# Patient Record
Sex: MALE | Race: WHITE | NOT HISPANIC OR LATINO | Employment: OTHER | URBAN - METROPOLITAN AREA
[De-identification: names, ages, dates, MRNs, and addresses within clinical notes are randomized per-mention and may not be internally consistent; named-entity substitution may affect disease eponyms.]

---

## 2024-05-24 DIAGNOSIS — M05.79 RHEUMATOID ARTHRITIS INVOLVING MULTIPLE SITES WITH POSITIVE RHEUMATOID FACTOR: Primary | ICD-10-CM

## 2024-05-30 RX ORDER — METHOTREXATE 2.5 MG/1
15 TABLET ORAL WEEKLY
COMMUNITY
Start: 2024-03-04

## 2024-05-30 RX ORDER — METHOTREXATE 2.5 MG/1
15 TABLET ORAL WEEKLY
Qty: 24 TABLET | Refills: 0 | Status: CANCELLED | OUTPATIENT
Start: 2024-05-30

## 2024-05-30 NOTE — TELEPHONE ENCOUNTER
Decline methotrexate refill until he does methotrexate monitoring labs.  No labs since 10/27/2023.  I signed new standing order for labs.  Please send lab order to him or have him come to [facility] for labs.

## 2024-06-02 PROBLEM — Z79.1 NSAID LONG-TERM USE: Chronic | Status: ACTIVE | Noted: 2024-06-02

## 2024-06-02 PROBLEM — M81.0 AGE-RELATED OSTEOPOROSIS WITHOUT CURRENT PATHOLOGICAL FRACTURE: Chronic | Status: ACTIVE | Noted: 2024-06-02

## 2024-06-02 PROBLEM — M05.9 SEROPOSITIVE RHEUMATOID ARTHRITIS: Chronic | Status: ACTIVE | Noted: 2024-06-02

## 2024-06-02 PROBLEM — M15.9 PRIMARY OSTEOARTHRITIS INVOLVING MULTIPLE JOINTS: Chronic | Status: ACTIVE | Noted: 2024-06-02

## 2024-06-02 PROBLEM — M15.0 PRIMARY OSTEOARTHRITIS INVOLVING MULTIPLE JOINTS: Chronic | Status: ACTIVE | Noted: 2024-06-02

## 2024-06-02 PROBLEM — Z79.899 ENCOUNTER FOR LONG-TERM (CURRENT) USE OF HIGH-RISK MEDICATION: Chronic | Status: ACTIVE | Noted: 2024-06-02

## 2024-06-03 PROBLEM — M05.79 RHEUMATOID ARTHRITIS OF MULTIPLE SITES WITHOUT ORGAN OR SYSTEM INVOLVEMENT WITH POSITIVE RHEUMATOID FACTOR: Status: ACTIVE | Noted: 2024-06-03

## 2024-06-03 NOTE — ASSESSMENT & PLAN NOTE
Methotrexate     Labs every 12 weeks for toxicity monitoring on methotrexate  We have no recent labs. Patient will go to [facility] in [place] to have labs done.  Methotrexate well tolerated without sign of toxicity

## 2024-06-03 NOTE — ASSESSMENT & PLAN NOTE
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 Swollen joint count 0. Tender joint count 0. good prognosis  continue mtx which is effective and well tolerated.  Medication refilled  The patient is doing satisfactorily on the current medical therapy. Recent labs reviewed. Plan will be to continue current medication, frequent intensive lab monitoring every 12 weeks (CBC, CMP) for toxicity monitoring and follow up in 6 months

## 2024-06-03 NOTE — ASSESSMENT & PLAN NOTE
DXA 3/20/2019  fosamax recommended 2017 - continually declined by the patient  Declines further bone density scans 11/22.  Discussed DEXA scans with patient today, he again declined.    Fosamax recommended in February 2017 but the patient has declined to take it due to potential side effects and schedule required to take it.    I recommended he take calcium and vitamin D and work on regular exercise weight   patient had dental work and was supposed to start Fosamax after the dental work was completed.  He continues to decline Fosamax or bisphosphonate therapy for his osteoporosis.  He understands high risk of fracture which could lead to death from his osteoporosis.  He declines further bone density scan

## 2024-06-03 NOTE — ASSESSMENT & PLAN NOTE
minimally symptomatic osteoarthritis on p.r.n. Sulindac   Risks of NSAIDs discussed including GI upset, GI bleeding, renal and hepatic risks and the risks of cardiovascular disease and stroke. Warned patient not to take with other NSAIDs including OTC NSAIDs.

## 2024-06-03 NOTE — ASSESSMENT & PLAN NOTE
Sulindac     Risks of NSAIDs discussed including GI upset, GI bleeding, renal and hepatic risks and the risks of cardiovascular disease and stroke. Warned patient not to take with other NSAIDs including OTC NSAIDs.

## 2024-06-04 ENCOUNTER — OFFICE VISIT (OUTPATIENT)
Age: 79
End: 2024-06-04
Payer: MEDICARE

## 2024-06-04 VITALS
SYSTOLIC BLOOD PRESSURE: 124 MMHG | WEIGHT: 178.3 LBS | HEART RATE: 72 BPM | DIASTOLIC BLOOD PRESSURE: 80 MMHG | TEMPERATURE: 97.7 F | BODY MASS INDEX: 25.53 KG/M2 | HEIGHT: 70 IN

## 2024-06-04 DIAGNOSIS — Z79.899 ENCOUNTER FOR LONG-TERM (CURRENT) USE OF HIGH-RISK MEDICATION: Chronic | ICD-10-CM

## 2024-06-04 DIAGNOSIS — M15.9 PRIMARY OSTEOARTHRITIS INVOLVING MULTIPLE JOINTS: Chronic | ICD-10-CM

## 2024-06-04 DIAGNOSIS — Z79.1 NSAID LONG-TERM USE: Chronic | ICD-10-CM

## 2024-06-04 DIAGNOSIS — M81.0 AGE-RELATED OSTEOPOROSIS WITHOUT CURRENT PATHOLOGICAL FRACTURE: Chronic | ICD-10-CM

## 2024-06-04 DIAGNOSIS — M05.9 SEROPOSITIVE RHEUMATOID ARTHRITIS: Primary | Chronic | ICD-10-CM

## 2024-06-04 PROCEDURE — 99214 OFFICE O/P EST MOD 30 MIN: CPT | Performed by: NURSE PRACTITIONER

## 2024-06-04 PROCEDURE — G2211 COMPLEX E/M VISIT ADD ON: HCPCS | Performed by: NURSE PRACTITIONER

## 2024-06-04 RX ORDER — SULINDAC 200 MG/1
200 TABLET ORAL DAILY PRN
Qty: 90 TABLET | Refills: 1 | Status: SHIPPED | OUTPATIENT
Start: 2024-06-04

## 2024-06-04 RX ORDER — METHOTREXATE 2.5 MG/1
15 TABLET ORAL WEEKLY
Qty: 90 TABLET | Refills: 1 | Status: SHIPPED | OUTPATIENT
Start: 2024-06-04

## 2024-06-04 NOTE — PROGRESS NOTES
Office Visit       Date: 06/04/2024   Patient Name: Cristopher Kaba  MRN: [id number]  YOB: 1945    Referring Physician: Evangelina Cantu, Unknown*     Chief Complaint:   Chief Complaint   Patient presents with    Rheumatoid Arthritis    Osteoarthritis       History of Present Illness: Cristopher Kaba is a 79 y.o. male who is here today for follow-up of rheumatoid arthritis.  He needs a refill of sulindac.  He reports feeling the same.  He rates his pain 5 out of 10 and has 10 minutes of morning stiffness.  He did not complete the RAPID3 today.  He reports he has pain in the hands when he overworks them.      Subjective   Review of Systems:      Past Medical History:   Past Medical History:   Diagnosis Date    High risk medication use     Osteoarthritis     Osteoporosis     Rheumatoid arthritis        Past Surgical History: History reviewed. No pertinent surgical history.    Family History:   Family History   Problem Relation Age of Onset    Arthritis Mother     Arthritis Father        Social History:   Social History     Socioeconomic History    Marital status:    Tobacco Use    Smoking status: Never    Smokeless tobacco: Never   Vaping Use    Vaping status: Never Used   Substance and Sexual Activity    Alcohol use: Not Currently    Drug use: Defer       Medications:   Current Outpatient Medications:     methotrexate 2.5 MG tablet, Take 6 tablets by mouth 1 (One) Time Per Week., Disp: 90 tablet, Rfl: 1    sulindac (CLINORIL) 200 MG tablet, Take 1 tablet by mouth Daily As Needed for Mild Pain., Disp: 90 tablet, Rfl: 1    Allergies: No Known Allergies    I have reviewed and updated the patient's chief complaint, history of present illness, review of systems, past medical history, surgical history, family history, social history, medications and allergy list as appropriate.     Objective    Vital Signs:   Vitals:    06/04/24 0946   BP: 124/80   BP Location: Left arm   Patient Position:  "Sitting   Cuff Size: Adult   Pulse: 72   Temp: 97.7 °F (36.5 °C)   Weight: 80.9 kg (178 lb 4.8 oz)   Height: 177.8 cm (70\")   PainSc:   5   PainLoc: Foot     Body mass index is 25.58 kg/m².   BMI is >= 25 and <30. (Overweight) The following options were offered after discussion;: none       Physical Exam:  Physical Exam  Vitals reviewed.   Constitutional:       Appearance: Normal appearance.   HENT:      Head: Normocephalic and atraumatic.      Mouth/Throat:      Mouth: Mucous membranes are moist.   Eyes:      Conjunctiva/sclera: Conjunctivae normal.   Cardiovascular:      Rate and Rhythm: Normal rate and regular rhythm.      Pulses: Normal pulses.      Heart sounds: Normal heart sounds.   Pulmonary:      Effort: Pulmonary effort is normal.      Breath sounds: Normal breath sounds.   Musculoskeletal:         General: Normal range of motion.      Cervical back: Normal range of motion and neck supple.      Comments: Crepitus right knee  OA changes bilateral DIP's.   Skin:     General: Skin is warm and dry.   Neurological:      General: No focal deficit present.      Mental Status: He is alert and oriented to person, place, and time. Mental status is at baseline.   Psychiatric:         Mood and Affect: Mood normal.         Behavior: Behavior normal.         Thought Content: Thought content normal.         Judgment: Judgment normal.          Results Review:   Imaging Results (Last 24 Hours)       ** No results found for the last 24 hours. **            Procedures    Assessment / Plan    Assessment/Plan:   Diagnoses and all orders for this visit:    1. 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 Swollen joint count 0. Tender joint count 0. good prognosis  continue mtx which is effective and " well tolerated.  Medication refilled  The patient is doing satisfactorily on the current medical therapy. Recent labs reviewed. Plan will be to continue current medication, frequent intensive lab monitoring every 12 weeks (CBC, CMP) for toxicity monitoring and follow up in 6 months    Orders:  -     sulindac (CLINORIL) 200 MG tablet; Take 1 tablet by mouth Daily As Needed for Mild Pain.  Dispense: 90 tablet; Refill: 1  -     C-reactive Protein; Standing  -     Sedimentation Rate; Standing  -     Comprehensive Metabolic Panel; Standing  -     methotrexate 2.5 MG tablet; Take 6 tablets by mouth 1 (One) Time Per Week.  Dispense: 90 tablet; Refill: 1  -     CBC & Differential; Future    2. Encounter for long-term (current) use of high-risk medication  Assessment & Plan:  Methotrexate     Labs every 12 weeks for toxicity monitoring on methotrexate  We have no recent labs. Patient will go to [facility] in [place] to have labs done.  Methotrexate well tolerated without sign of toxicity    Orders:  -     C-reactive Protein; Standing  -     Sedimentation Rate; Standing  -     Comprehensive Metabolic Panel; Standing  -     CBC & Differential; Future    3. NSAID long-term use  Assessment & Plan:  Sulindac     Risks of NSAIDs discussed including GI upset, GI bleeding, renal and hepatic risks and the risks of cardiovascular disease and stroke. Warned patient not to take with other NSAIDs including OTC NSAIDs.      4. Age-related osteoporosis without current pathological fracture  Assessment & Plan:  DXA 3/20/2019  fosamax recommended 2017 - continually declined by the patient  Declines further bone density scans 11/22.  Discussed DEXA scans with patient today, he again declined.    Fosamax recommended in February 2017 but the patient has declined to take it due to potential side effects and schedule required to take it.    I recommended he take calcium and vitamin D and work on regular exercise weight   patient had dental work  and was supposed to start Fosamax after the dental work was completed.  He continues to decline Fosamax or bisphosphonate therapy for his osteoporosis.  He understands high risk of fracture which could lead to death from his osteoporosis.  He declines further bone density scan      5. Primary osteoarthritis involving multiple joints  Assessment & Plan:  minimally symptomatic osteoarthritis on p.r.n. Sulindac   Risks of NSAIDs discussed including GI upset, GI bleeding, renal and hepatic risks and the risks of cardiovascular disease and stroke. Warned patient not to take with other NSAIDs including OTC NSAIDs.    Orders:  -     sulindac (CLINORIL) 200 MG tablet; Take 1 tablet by mouth Daily As Needed for Mild Pain.  Dispense: 90 tablet; Refill: 1        Follow Up:   Return in about 4 months (around 10/4/2024) for Dr. Ahn, MACI Sky.        MACI Chery  [facility]

## 2024-10-08 ENCOUNTER — OFFICE VISIT (OUTPATIENT)
Age: 79
End: 2024-10-08
Payer: MEDICARE

## 2024-10-08 VITALS
BODY MASS INDEX: 23.98 KG/M2 | WEIGHT: 177 LBS | TEMPERATURE: 97.3 F | HEIGHT: 72 IN | SYSTOLIC BLOOD PRESSURE: 120 MMHG | HEART RATE: 58 BPM | DIASTOLIC BLOOD PRESSURE: 82 MMHG

## 2024-10-08 DIAGNOSIS — Z79.899 ENCOUNTER FOR LONG-TERM (CURRENT) USE OF HIGH-RISK MEDICATION: ICD-10-CM

## 2024-10-08 DIAGNOSIS — M05.9 SEROPOSITIVE RHEUMATOID ARTHRITIS: Primary | ICD-10-CM

## 2024-10-08 PROCEDURE — 1159F MED LIST DOCD IN RCRD: CPT | Performed by: INTERNAL MEDICINE

## 2024-10-08 PROCEDURE — 1160F RVW MEDS BY RX/DR IN RCRD: CPT | Performed by: INTERNAL MEDICINE

## 2024-10-08 PROCEDURE — 99214 OFFICE O/P EST MOD 30 MIN: CPT | Performed by: INTERNAL MEDICINE

## 2024-10-08 PROCEDURE — G2211 COMPLEX E/M VISIT ADD ON: HCPCS | Performed by: INTERNAL MEDICINE

## 2024-10-08 RX ORDER — FOLIC ACID 1 MG/1
1 TABLET ORAL DAILY
Qty: 90 TABLET | Refills: 3 | Status: SHIPPED | OUTPATIENT
Start: 2024-10-08

## 2024-10-08 NOTE — PROGRESS NOTES
Office Visit       Date: 10/08/2024   Patient Name: Cristopher Kaba  MRN: [id number]  YOB: 1945    Referring Physician: No ref. provider found     Chief Complaint: Rheumatoid arthritis  No chief complaint on file.      History of Present Illness: Cristopher Kaba is a 79 y.o. male who is here today for follow-up of rheumatoid arthritis.      Overall doing well on methotrexate.  No rheumatoid flares.  No side effects to medication.    He has not done labs in some time.  He knows he is way overdue.    He reports he has pain in the hands when he overworks them.      Subjective   Review of Systems:      Past Medical History:   Past Medical History:   Diagnosis Date    High risk medication use     Osteoarthritis     Osteoporosis     Rheumatoid arthritis        Past Surgical History: History reviewed. No pertinent surgical history.    Family History:   Family History   Problem Relation Age of Onset    Arthritis Mother     Arthritis Father        Social History:   Social History     Socioeconomic History    Marital status:    Tobacco Use    Smoking status: Never    Smokeless tobacco: Never   Vaping Use    Vaping status: Never Used   Substance and Sexual Activity    Alcohol use: Not Currently    Drug use: Defer    Sexual activity: Defer       Medications:   Current Outpatient Medications:     methotrexate 2.5 MG tablet, Take 6 tablets by mouth 1 (One) Time Per Week., Disp: 90 tablet, Rfl: 1    sulindac (CLINORIL) 200 MG tablet, Take 1 tablet by mouth Daily As Needed for Mild Pain., Disp: 90 tablet, Rfl: 1    folic acid (FOLVITE) 1 MG tablet, Take 1 tablet by mouth Daily., Disp: 90 tablet, Rfl: 3    Allergies: No Known Allergies    I have reviewed and updated the patient's chief complaint, history of present illness, review of systems, past medical history, surgical history, family history, social history, medications and allergy list as appropriate.     Objective    Vital Signs:   Vitals:     "10/08/24 1011   BP: 120/82   BP Location: Left arm   Patient Position: Sitting   Cuff Size: Adult   Pulse: 58   Temp: 97.3 °F (36.3 °C)   Weight: 80.3 kg (177 lb)   Height: 182.9 cm (72\")   PainSc:   5   PainLoc: Generalized       Body mass index is 24.01 kg/m².   BMI is >= 25 and <30. (Overweight) The following options were offered after discussion;: none       Physical Exam:  MUSCULOSKELETAL:   No peripheral synovitis  OA changes hands  Crepitus knees    Complete joint exam was performed including the MCPs, PIPs, DIPs of the hands, wrists, elbows, shoulders, hips, knees and ankles.  No soft tissue swelling or tenderness is present except as above.    General: The patient is well-developed and well nourished. Cooperative, alert and oriented. Affect is normal. Hydration appears normal.   HEENT: Normocephalic and atraumatic. No notable alopecia. Lids and conjunctiva are normal. Pupils are equal and sclera are clear. Oropharynx is clear   NECK neck is supple without adenopathy, masses or thyromegaly.   CARDIOVASCULAR: Regular rate and rhythm. No murmurs, rubs or gallops   LUNGS: Effort is normal. Lungs are clear bilateral   ABDOMEN: Not examined  EXTREMITIES: Peripheral pulses are intact. No clubbing.   SKIN: No rashes. No subcutaneous nodules. No digital ulcers. No sclerodactyly.   NEUROLOGIC: Gait is normal. Strength testing is normal.  No focal neurologic deficits     Results Review:   Imaging Results (Last 24 Hours)       ** No results found for the last 24 hours. **            Procedures    Assessment / Plan           1. 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 Swollen joint count 0. Tender joint count 0. good prognosis  continue mtx 15 mg once weekly which is effective and well " tolerated.  -He admits he has not done labs in some time maybe over 6 months  -Reinforced that he needs labs every 12 weeks for monitoring on methotrexate.  New standing lab order provided  -No refill methotrexate until labs done and received  The patient is doing satisfactorily on the current medical therapy. Recent labs reviewed. Plan will be to continue current medication, frequent intensive lab monitoring every 12 weeks (CBC, CMP) for toxicity monitoring and follow up in 4 months       2. Encounter for long-term (current) use of high-risk medication  Assessment & Plan:  Methotrexate      Labs every 12 weeks for toxicity monitoring on methotrexate  We have no recent labs. Patient will go to [facility] in [place] to have labs done.    Methotrexate well tolerated without sign of toxicity    Risk include but are not limited to severe liver damage so can be fatal, the possible need for liver biopsy, bone marrow suppression that can lead to dangerously low blood counts, GI side effects including mouth sores and diarrhea, fatigue, and the rare risk of severe pulmonary complications.  There should be no alcohol consumed with methotrexate.  Methotrexate can cause severe fetal abnormalities with his mother father is taking the medication and thus must be avoided if pregnancy is a possibility.  All medication is to be taken 1 day a week only.  The need for Q 8-12-week labs and the need for folic acid supplement were discussed.        3. NSAID long-term use  Assessment & Plan:  Sulindac      Risks of NSAIDs discussed including GI upset, GI bleeding, renal and hepatic risks and the risks of cardiovascular disease and stroke. Warned patient not to take with other NSAIDs including OTC NSAIDs.        4. Age-related osteoporosis without current pathological fracture  Assessment & Plan:  DXA 3/20/2019  fosamax recommended 2017 - continually declined by the patient  Declines further bone density scans 11/22.  Discussed DEXA scans  with patient today, he again declined.     Fosamax recommended in February 2017 but the patient has declined to take it due to potential side effects and schedule required to take it.    I recommended he take calcium and vitamin D and work on regular exercise weight   patient had dental work and was supposed to start Fosamax after the dental work was completed.    He continues to decline Fosamax or bisphosphonate therapy for his osteoporosis.  He understands high risk of fracture which could lead to death from his osteoporosis.  He declines further bone density scans        5. Primary osteoarthritis involving multiple joints  Assessment & Plan:  minimally symptomatic osteoarthritis on p.r.n. Sulindac   Risks of NSAIDs discussed including GI upset, GI bleeding, renal and hepatic risks and the risks of cardiovascular disease and stroke. Warned patient not to take with other NSAIDs including OTC NSAIDs.        Assessment/Plan:   Diagnoses and all orders for this visit:    1. Seropositive rheumatoid arthritis (Primary)  -     Comprehensive Metabolic Panel; Standing  -     CBC Auto Differential; Standing  -     C-reactive Protein; Standing  -     Sedimentation Rate; Standing    2. Encounter for long-term (current) use of high-risk medication  -     Comprehensive Metabolic Panel; Standing  -     CBC Auto Differential; Standing  -     C-reactive Protein; Standing  -     Sedimentation Rate; Standing    Other orders  -     folic acid (FOLVITE) 1 MG tablet; Take 1 tablet by mouth Daily.  Dispense: 90 tablet; Refill: 3          Follow Up:   Return in about 4 months (around 2/8/2025) for Followup REGINE Ahn MD  [facility] [place]

## 2024-12-27 ENCOUNTER — TELEPHONE (OUTPATIENT)
Age: 79
End: 2024-12-27
Payer: MEDICARE

## 2024-12-27 DIAGNOSIS — M15.0 PRIMARY OSTEOARTHRITIS INVOLVING MULTIPLE JOINTS: Chronic | ICD-10-CM

## 2024-12-27 DIAGNOSIS — M05.9 SEROPOSITIVE RHEUMATOID ARTHRITIS: Chronic | ICD-10-CM

## 2024-12-27 RX ORDER — SULINDAC 200 MG/1
200 TABLET ORAL DAILY PRN
Qty: 90 TABLET | Refills: 1 | Status: SHIPPED | OUTPATIENT
Start: 2024-12-27

## 2025-02-17 ENCOUNTER — TELEPHONE (OUTPATIENT)
Age: 80
End: 2025-02-17
Payer: MEDICARE

## 2025-02-18 ENCOUNTER — TELEPHONE (OUTPATIENT)
Age: 80
End: 2025-02-18
Payer: MEDICARE

## 2025-02-18 NOTE — TELEPHONE ENCOUNTER
File Link    Scan on 2/17/2025 0932 by [employer], [place]: TOMAS DOUGHERTY, [id number]        Key Information    Document ID File Type Document Type Description   [id number] Image REFERRAL/PRE-AUTH MRN - SCAN TOMAS DOUGHERTY, [id number]     Import Information    Attached At Date Time User Dept   Patient Level 2/17/2025  9:32 AM [employer], [place]

## 2025-02-20 NOTE — TELEPHONE ENCOUNTER
Pt states he got labs done at [facility] in [place]. I was unable to locate them and even called the office. Pt is upset because he is almost out oh MTX but I advised him we needed labs first. Last labs 11/7/2024

## 2025-02-20 NOTE — TELEPHONE ENCOUNTER
"I spoke with patient and reiterated to him that we cannot send in refill of methotrexate until he has labs done. I told him that it is preferred for patients to have labs done every 8 weeks but Dr. Ahn will let patients go 12 weeks in between labs, but at this point he has not had labs done in 15 weeks. I  let him know that [facility] in [place] has his standing order but I can also fax it again to ensure they have it for him. Patient states that he would have a refill still if we sent it in after he had labs in November like we were supposed to. I do see that [facility] documented in note from 10/8/24 that we could not refill until labs were done, but we were not contacted until now asking for refill. I advised patient that this was irrelevant as even if he had refills he was still supposed to have labs done every 12 weeks. Advised him that regular lab monitoring was for his own good to monitor liver enzymes, blood counts, etc. Patient wanted to know what would happen if he stops methotrexate and   I told him he could experience pain/swelling, but our hands were tied and we cannot refill without the labs. Patient was silent for several seconds so I asked if he was still there, and he states that he is \"trying not to say something that he shouldn't.\" I said, \"okay.\" He states that he will not to be able to go have labs done for probably a week. I told him to let us know once he has labs done so that we can make sure we get results and send in refill. Patient states he \"thinks this is wrong.\" I told him that he was entitled to that opinion but I was just doing my job. Patient said, \"fine\" and disconnected call.  "

## 2025-02-27 DIAGNOSIS — M05.9 SEROPOSITIVE RHEUMATOID ARTHRITIS: Chronic | ICD-10-CM

## 2025-02-27 RX ORDER — METHOTREXATE 2.5 MG/1
15 TABLET ORAL WEEKLY
Qty: 72 TABLET | Refills: 0 | Status: SHIPPED | OUTPATIENT
Start: 2025-02-27

## 2025-04-06 NOTE — PROGRESS NOTES
Office Visit       Date: 04/08/2025   Patient Name: Cristopher Kaba  MRN: [id number]  YOB: 1945    Referring Physician: No ref. provider found     Chief Complaint: Rheumatoid arthritis  Chief Complaint   Patient presents with    Rheumatoid Arthritis       History of Present Illness: Cristopher Kaba is a 80 y.o. male who is here today for follow-up of rheumatoid arthritis.      Overall doing well on methotrexate.  No rheumatoid flares.  No side effects to medication.    He has not done labs in some time.  He knows he is way overdue.    He reports he has pain in the hands when he overworks them.    1 4/8/2025: Today patient reports feeling the same.  He rates his pain 5.5 out of 10, global 3 out of 10 and 1 minute of morning stiffness.  He would like a refill of methotrexate today.    Subjective   Review of Systems:  Patient denies all symptoms on review of symptoms page today    Past Medical History:   Past Medical History:   Diagnosis Date    High risk medication use     Osteoarthritis     Osteoporosis     Rheumatoid arthritis        Past Surgical History: History reviewed. No pertinent surgical history.    Family History:   Family History   Problem Relation Age of Onset    Arthritis Mother     Arthritis Father        Social History:   Social History     Socioeconomic History    Marital status:    Tobacco Use    Smoking status: Never    Smokeless tobacco: Never   Vaping Use    Vaping status: Never Used   Substance and Sexual Activity    Alcohol use: Not Currently    Drug use: Defer    Sexual activity: Defer       Medications:   Current Outpatient Medications:     folic acid (FOLVITE) 1 MG tablet, Take 1 tablet by mouth Daily., Disp: 90 tablet, Rfl: 3    methotrexate 2.5 MG tablet, Take 6 tablets by mouth 1 (One) Time Per Week., Disp: 72 tablet, Rfl: 1    sulindac (CLINORIL) 200 MG tablet, Take 1 tablet by mouth Daily As Needed for Mild Pain., Disp: 90 tablet, Rfl: 1    Allergies: No  "Known Allergies    I have reviewed and updated the patient's chief complaint, history of present illness, review of systems, past medical history, surgical history, family history, social history, medications and allergy list as appropriate.     Objective    Vital Signs:   Vitals:    04/08/25 1029   BP: 114/72   BP Location: Left arm   Patient Position: Sitting   Cuff Size: Adult   Pulse: 68   Temp: 96.9 °F (36.1 °C)   Weight: 81.1 kg (178 lb 14.4 oz)   Height: 182.9 cm (72.01\")   PainSc: 6          Body mass index is 24.26 kg/m².   BMI is >= 25 and <30. (Overweight) The following options were offered after discussion;: none       Physical Exam:  MUSCULOSKELETAL:   No peripheral synovitis  OA changes hands  Crepitus knees    Complete joint exam was performed including the MCPs, PIPs, DIPs of the hands, wrists, elbows, shoulders, hips, knees and ankles.  No soft tissue swelling or tenderness is present except as above.    General: The patient is well-developed and well nourished. Cooperative, alert and oriented. Affect is normal. Hydration appears normal.   HEENT: Normocephalic and atraumatic. No notable alopecia. Lids and conjunctiva are normal. Pupils are equal and sclera are clear. Oropharynx is clear   NECK neck is supple without adenopathy, masses or thyromegaly.   CARDIOVASCULAR: Regular rate and rhythm. No murmurs, rubs or gallops   LUNGS: Effort is normal. Lungs are clear bilateral   ABDOMEN: Not examined  EXTREMITIES: Peripheral pulses are intact. No clubbing.   SKIN: No rashes. No subcutaneous nodules. No digital ulcers. No sclerodactyly.   NEUROLOGIC: Gait is normal. Strength testing is normal.  No focal neurologic deficits     Results Review:   Imaging Results (Last 24 Hours)       ** No results found for the last 24 hours. **            Procedures    Assessment / Plan           1. 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 Swollen joint count 0. Tender joint count 0. good prognosis  continue mtx 15 mg once weekly which is effective and well tolerated.  - Labs from 2/2025 stable  - Standing lab order given to patient  The patient is doing satisfactorily on the current medical therapy. Recent labs reviewed. Plan will be to continue current medication, frequent intensive lab monitoring every 12 weeks (CBC, CMP) for toxicity monitoring and follow up in 4 months       2. Encounter for long-term (current) use of high-risk medication  Assessment & Plan:  Methotrexate      Labs every 12 weeks for toxicity monitoring on methotrexate  We have no recent labs. Patient will go to [facility] in [place] to have labs done.    Methotrexate well tolerated without sign of toxicity    Risk include but are not limited to severe liver damage so can be fatal, the possible need for liver biopsy, bone marrow suppression that can lead to dangerously low blood counts, GI side effects including mouth sores and diarrhea, fatigue, and the rare risk of severe pulmonary complications.  There should be no alcohol consumed with methotrexate.  Methotrexate can cause severe fetal abnormalities with his mother father is taking the medication and thus must be avoided if pregnancy is a possibility.  All medication is to be taken 1 day a week only.  The need for Q 8-12-week labs and the need for folic acid supplement were discussed.        3. NSAID long-term use  Assessment & Plan:  Sulindac      Risks of NSAIDs discussed including GI upset, GI bleeding, renal and hepatic risks and the risks of cardiovascular disease and stroke. Warned patient not to take with other NSAIDs including OTC NSAIDs.        4. Age-related osteoporosis without current pathological fracture  Assessment & Plan:  DXA 3/20/2019  fosamax recommended  2017 - continually declined by the patient  Declines further bone density scans 11/22.  Discussed DEXA scans with patient today, he again declined.     Fosamax recommended in February 2017 but the patient has declined to take it due to potential side effects and schedule required to take it.    I recommended he take calcium and vitamin D and work on regular exercise weight   patient had dental work and was supposed to start Fosamax after the dental work was completed.    He continues to decline Fosamax or bisphosphonate therapy for his osteoporosis.  He understands high risk of fracture which could lead to death from his osteoporosis.  He declines further bone density scans        5. Primary osteoarthritis involving multiple joints  Assessment & Plan:  minimally symptomatic osteoarthritis on p.r.n. Sulindac   Risks of NSAIDs discussed including GI upset, GI bleeding, renal and hepatic risks and the risks of cardiovascular disease and stroke. Warned patient not to take with other NSAIDs including OTC NSAIDs.        Assessment/Plan:   Diagnoses and all orders for this visit:    1. Seropositive rheumatoid arthritis (Primary)  -     C-reactive Protein; Standing  -     Sedimentation Rate; Standing  -     Comprehensive Metabolic Panel; Standing  -     CBC (No Diff); Future  -     methotrexate 2.5 MG tablet; Take 6 tablets by mouth 1 (One) Time Per Week.  Dispense: 72 tablet; Refill: 1  -     sulindac (CLINORIL) 200 MG tablet; Take 1 tablet by mouth Daily As Needed for Mild Pain.  Dispense: 90 tablet; Refill: 1    2. Encounter for long-term (current) use of high-risk medication  -     C-reactive Protein; Standing  -     Sedimentation Rate; Standing  -     Comprehensive Metabolic Panel; Standing  -     CBC (No Diff); Future    3. NSAID long-term use    4. Age-related osteoporosis without current pathological fracture    5. Primary osteoarthritis involving multiple joints  -     sulindac (CLINORIL) 200 MG tablet; Take 1  tablet by mouth Daily As Needed for Mild Pain.  Dispense: 90 tablet; Refill: 1            Follow Up:   Return in about 4 months (around 8/8/2025) for Dr. Ahn, NP.        MACI Chery    [facility]

## 2025-04-08 ENCOUNTER — OFFICE VISIT (OUTPATIENT)
Age: 80
End: 2025-04-08
Payer: MEDICARE

## 2025-04-08 VITALS
SYSTOLIC BLOOD PRESSURE: 114 MMHG | TEMPERATURE: 96.9 F | HEIGHT: 72 IN | WEIGHT: 178.9 LBS | HEART RATE: 68 BPM | BODY MASS INDEX: 24.23 KG/M2 | DIASTOLIC BLOOD PRESSURE: 72 MMHG

## 2025-04-08 DIAGNOSIS — Z79.1 NSAID LONG-TERM USE: Chronic | ICD-10-CM

## 2025-04-08 DIAGNOSIS — M81.0 AGE-RELATED OSTEOPOROSIS WITHOUT CURRENT PATHOLOGICAL FRACTURE: Chronic | ICD-10-CM

## 2025-04-08 DIAGNOSIS — M15.0 PRIMARY OSTEOARTHRITIS INVOLVING MULTIPLE JOINTS: Chronic | ICD-10-CM

## 2025-04-08 DIAGNOSIS — Z79.899 ENCOUNTER FOR LONG-TERM (CURRENT) USE OF HIGH-RISK MEDICATION: Chronic | ICD-10-CM

## 2025-04-08 DIAGNOSIS — M05.9 SEROPOSITIVE RHEUMATOID ARTHRITIS: Primary | Chronic | ICD-10-CM

## 2025-04-08 PROCEDURE — 99214 OFFICE O/P EST MOD 30 MIN: CPT | Performed by: NURSE PRACTITIONER

## 2025-04-08 PROCEDURE — G2211 COMPLEX E/M VISIT ADD ON: HCPCS | Performed by: NURSE PRACTITIONER

## 2025-04-08 RX ORDER — SULINDAC 200 MG/1
200 TABLET ORAL DAILY PRN
Qty: 90 TABLET | Refills: 1 | Status: SHIPPED | OUTPATIENT
Start: 2025-04-08

## 2025-04-08 RX ORDER — METHOTREXATE 2.5 MG/1
15 TABLET ORAL WEEKLY
Qty: 72 TABLET | Refills: 1 | Status: SHIPPED | OUTPATIENT
Start: 2025-04-08

## 2025-05-09 ENCOUNTER — OFFICE VISIT (OUTPATIENT)
Dept: FAMILY MEDICINE CLINIC | Facility: CLINIC | Age: 80
End: 2025-05-09
Payer: MEDICARE

## 2025-05-09 VITALS
BODY MASS INDEX: 23.5 KG/M2 | WEIGHT: 173.5 LBS | DIASTOLIC BLOOD PRESSURE: 88 MMHG | HEART RATE: 78 BPM | HEIGHT: 72 IN | OXYGEN SATURATION: 97 % | SYSTOLIC BLOOD PRESSURE: 132 MMHG

## 2025-05-09 DIAGNOSIS — G51.8 NEURALGIC FACIAL PAIN: Primary | ICD-10-CM

## 2025-05-09 NOTE — PROGRESS NOTES
Office Note     Name: Cristopher Kaba    : 1945     MRN: [id number]     Chief Complaint  Establish Care and Earache (Pt states having some left ear pain for about 2 weeks now.)    Subjective     History of Present Illness:  Cristopher Kaba is a 80 y.o. male who presents today for re-establishing care in this office. He previously saw Dr. Horton for PCP, hasn't seen him in many years. Has been seeing Rheumatology as main source of healthcare.     History of Present Illness  The patient presents for evaluation of left-sided head pain.    He reports experiencing sharp, shooting pain on the left side of his head for approximately 2 weeks. The pain is described as severe, akin to a stabbing sensation, and is localized to the temple area and in front of the ear. The frequency of the pain varies, occurring every 10 to 15 seconds or lasting longer. He also reports occasional sharp, shooting pain elsewhere on his face or head, but this is infrequent. He reports no recent trauma to the side of his head, falls, or being hit with anything. He has had previous episodes of similar pain in the past that resolved spontaneously within a day. He is unaware of any teeth clenching or grinding but notes he might clench his teeth during episodes of sharp pain. He reports no history of shingles. He has not had any recent dental work and wears partial dentures. He has been taking Tylenol Extra Strength 500 mg, which provides some relief. He has been taking sulindac 1 tablet daily and methotrexate, which he resumed a week ago after a 1.5-week break. He is considering increasing his sulindac dosage to twice daily. Initially suspecting cerumen impaction, he sought treatment at [facility] where both ears were cleaned, but this did not alleviate the pain. He uses hearing aids, which he has not been wearing due to the pain. He reports no associated visual disturbances such as blurriness, fuzziness, spots, or flashes.    He has a decreased  "appetite and has lost weight. He has difficulty sleeping at night due to the pain and tends to sleep more during the day.    He is under the care of a rheumatologist for rheumatoid arthritis, with whom he consults 2 to 3 times annually. His last consultation was in 04/2025.      Objective     Past Medical History:   Diagnosis Date    High risk medication use     Osteoarthritis     Osteoporosis     Rheumatoid arthritis      History reviewed. No pertinent surgical history.  Family History   Problem Relation Age of Onset    Arthritis Mother     Arthritis Father        Vital Signs  /88 (BP Location: Left arm, Patient Position: Sitting, Cuff Size: Adult)   Pulse 78   Ht 182.9 cm (72\")   Wt 78.7 kg (173 lb 8 oz)   SpO2 97%   BMI 23.53 kg/m²   Estimated body mass index is 23.53 kg/m² as calculated from the following:    Height as of this encounter: 182.9 cm (72\").    Weight as of this encounter: 78.7 kg (173 lb 8 oz).    Physical Exam  Vitals reviewed.   Constitutional:       Appearance: Normal appearance.   HENT:      Head:      Jaw: No tenderness.      Comments: No areas of lzheimadae-qc-whukwouqc noted anywhere on patient's face/hairline/scalp     Right Ear: Tympanic membrane, ear canal and external ear normal.      Left Ear: Tympanic membrane, ear canal and external ear normal.   Cardiovascular:      Rate and Rhythm: Normal rate and regular rhythm.      Heart sounds: Normal heart sounds.   Pulmonary:      Effort: Pulmonary effort is normal.      Breath sounds: Normal breath sounds.   Skin:     General: Skin is warm and dry.   Neurological:      General: No focal deficit present.      Mental Status: He is alert and oriented to person, place, and time.   Psychiatric:         Mood and Affect: Mood normal.         Behavior: Behavior normal.        Physical Exam  Ears: External ear canals and tympanic membranes intact  Mouth/Throat: Mucous membranes moist, no erythema, no exudate  Respiratory: Clear to " auscultation, no wheezing, rales or rhonchi  Cardiovascular: Regular rate and rhythm, no murmurs, rubs, or gallops    Results         Assessment and Plan     Diagnoses and all orders for this visit:    1. Neuralgic facial pain (Primary)  -     CBC & Differential  -     Comprehensive Metabolic Panel  -     C-reactive Protein  -     Sedimentation Rate  -     Varicella Zoster Antibodies, IgG / IgM      Assessment & Plan  1. Left-sided head pain.  - The etiology of the pain remains uncertain. It could potentially be attributed to a shingles outbreak, even in the absence of a rash. The possibility of an infection cannot be ruled out at this stage.  - Physical examination revealed no tenderness or inflammation in the TMJ area, and no signs of infection were noted.  - A comprehensive blood panel will be ordered today to assess blood counts, electrolytes, inflammatory markers, and to test for shingles. The results are expected by Monday, and he will be contacted with the findings.  - He has been advised to continue his current regimen of methotrexate, sulindac, and Tylenol Extra Strength 500 mg, taking 2 tablets every 6 hours as needed for pain relief. He has been cautioned against the use of Advil due to its potential interaction with sulindac. If the lab results indicate a positive test for shingles, an antiviral medication will be initiated. If the results suggest an infection, appropriate treatment will be commenced. Should his symptoms exacerbate before Monday, he has been instructed to seek immediate medical attention at the [facility] in [place].    2. Weight loss.  - He has experienced a weight loss of 5 pounds since his last visit to the rheumatologist in 04/2025.  - Weight recorded at 173 pounds today, down from 178 pounds in 04/2025.  - Discussed the potential impact of decreased appetite on weight loss.  - Monitoring weight and appetite as part of ongoing assessment.      Follow Up  Return for Medicare  Wellness.      Patient or patient representative verbalized consent for the use of Ambient Listening during the visit with  MACI Taylor for chart documentation. 5/18/2025  11:31 EDT    MACI Taylor

## 2025-05-12 ENCOUNTER — TELEPHONE (OUTPATIENT)
Dept: FAMILY MEDICINE CLINIC | Facility: CLINIC | Age: 80
End: 2025-05-12
Payer: MEDICARE

## 2025-05-12 LAB
ALBUMIN SERPL-MCNC: 4.6 G/DL (ref 3.8–4.8)
ALP SERPL-CCNC: 103 IU/L (ref 44–121)
ALT SERPL-CCNC: 16 IU/L (ref 0–44)
AST SERPL-CCNC: 17 IU/L (ref 0–40)
BASOPHILS # BLD AUTO: 0 X10E3/UL (ref 0–0.2)
BASOPHILS NFR BLD AUTO: 1 %
BILIRUB SERPL-MCNC: 0.9 MG/DL (ref 0–1.2)
BUN SERPL-MCNC: 14 MG/DL (ref 8–27)
BUN/CREAT SERPL: 18 (ref 10–24)
CALCIUM SERPL-MCNC: 9.8 MG/DL (ref 8.6–10.2)
CHLORIDE SERPL-SCNC: 101 MMOL/L (ref 96–106)
CO2 SERPL-SCNC: 23 MMOL/L (ref 20–29)
CREAT SERPL-MCNC: 0.79 MG/DL (ref 0.76–1.27)
CRP SERPL-MCNC: 10 MG/L (ref 0–10)
EGFRCR SERPLBLD CKD-EPI 2021: 90 ML/MIN/1.73
EOSINOPHIL # BLD AUTO: 0 X10E3/UL (ref 0–0.4)
EOSINOPHIL NFR BLD AUTO: 0 %
ERYTHROCYTE [DISTWIDTH] IN BLOOD BY AUTOMATED COUNT: 12.9 % (ref 11.6–15.4)
ERYTHROCYTE [SEDIMENTATION RATE] IN BLOOD BY WESTERGREN METHOD: 7 MM/HR (ref 0–30)
GLOBULIN SER CALC-MCNC: 2.9 G/DL (ref 1.5–4.5)
GLUCOSE SERPL-MCNC: 99 MG/DL (ref 70–99)
HCT VFR BLD AUTO: 50.8 % (ref 37.5–51)
HGB BLD-MCNC: 16.7 G/DL (ref 13–17.7)
IMM GRANULOCYTES # BLD AUTO: 0 X10E3/UL (ref 0–0.1)
IMM GRANULOCYTES NFR BLD AUTO: 0 %
LYMPHOCYTES # BLD AUTO: 1.6 X10E3/UL (ref 0.7–3.1)
LYMPHOCYTES NFR BLD AUTO: 21 %
MCH RBC QN AUTO: 32.3 PG (ref 26.6–33)
MCHC RBC AUTO-ENTMCNC: 32.9 G/DL (ref 31.5–35.7)
MCV RBC AUTO: 98 FL (ref 79–97)
MONOCYTES # BLD AUTO: 0.4 X10E3/UL (ref 0.1–0.9)
MONOCYTES NFR BLD AUTO: 5 %
NEUTROPHILS # BLD AUTO: 5.6 X10E3/UL (ref 1.4–7)
NEUTROPHILS NFR BLD AUTO: 73 %
PLATELET # BLD AUTO: 231 X10E3/UL (ref 150–450)
POTASSIUM SERPL-SCNC: 4.2 MMOL/L (ref 3.5–5.2)
PROT SERPL-MCNC: 7.5 G/DL (ref 6–8.5)
RBC # BLD AUTO: 5.17 X10E6/UL (ref 4.14–5.8)
SODIUM SERPL-SCNC: 141 MMOL/L (ref 134–144)
VZV IGG SER QL IA: REACTIVE
VZV IGM SER QL IA: <0.91 INDEX (ref 0–0.9)
WBC # BLD AUTO: 7.6 X10E3/UL (ref 3.4–10.8)

## 2025-05-12 NOTE — TELEPHONE ENCOUNTER
His labs resulted at 1509 this afternoon and they are completely normal. The zoster antibody test shows he has had chicken pox in the past, but this pain in his face is NOT shingles!     If his pain is severe, I suggest he go to ER for a more thorough and expedited evaluation. Anything that I do from an outpatient standpoint is going to take time....     With that being said, like we discussed during his appointment, I am happy to refer him to neurology and we can order a scan of his head/brain/sinuses to makes sure there isn't an abnormality there that would explain his pain. Would he like for me to do either or both of those?

## 2025-05-12 NOTE — TELEPHONE ENCOUNTER
"    Caller: Cristopher Kaba \"Henrry\"    Relationship: Self    Best call back number: [phone]     Caller requesting test results: YES, LABS    What test was performed: LABS    When was the test performed: 5/9/25    Where was the test performed: IN HOUSE.    Additional notes: PAIN IN LEFT SIDE OF FACE.  TAKING 1,000 MG OF TYLENOL TO CONTROL PAIN.    PLEASE CALL PATIENT BACK TO ADVISE WHAT TO DO AND TEST RESULTS.          "

## 2025-05-13 ENCOUNTER — TELEPHONE (OUTPATIENT)
Dept: FAMILY MEDICINE CLINIC | Facility: CLINIC | Age: 80
End: 2025-05-13
Payer: MEDICARE

## 2025-05-13 NOTE — TELEPHONE ENCOUNTER
"  Caller: Cristopher Kaba \"Henrry\"    Relationship: Self    Best call back number: [phone]     Caller requesting test results: PT    What test was performed: BLOOD WORK    When was the test performed: 05-09-25    Where was the test performed: OFFICE    Additional notes: PT WOULD LIKE A CALL TO DISCUSS THE TEST RESULTS. OKAY VM ON SECURE LINE.    "

## 2025-05-13 NOTE — TELEPHONE ENCOUNTER
LM on VM to call back.    HUB to relay message from Ruth     I'm glad his pain is better, obviously, but I suggest he call his dentist and request specific instructions for the Amoxicillin, or he needs to read his bottle more carefully - Amoxicillin is not a once-daily medication!! Depending on the dose he was prescribed, he is likely supposed to be taking 1 or 2 pills twice-daily for 7-10 days.

## 2025-05-13 NOTE — TELEPHONE ENCOUNTER
Patient informed and voiced understanding.      Patient states that prior to being seen in the office, he called his dentist and informed them of the issue. As he was waiting to hear back from them he saw our office. He did however receive amoxicillin from the dentist without a call from them. He states he has been taking the ABX once daily since Friday 05/09/25 and his pain has now got better. He has not taken any tylenol today for any pain.

## 2025-05-13 NOTE — TELEPHONE ENCOUNTER
I'm glad his pain is better, obviously, but I suggest he call his dentist and request specific instructions for the Amoxicillin, or he needs to read his bottle more carefully - Amoxicillin is not a once-daily medication!! Depending on the dose he was prescribed, he is likely supposed to be taking 1 or 2 pills twice-daily for 7-10 days.

## 2025-05-21 ENCOUNTER — TELEPHONE (OUTPATIENT)
Dept: FAMILY MEDICINE CLINIC | Facility: CLINIC | Age: 80
End: 2025-05-21

## 2025-05-21 ENCOUNTER — OFFICE VISIT (OUTPATIENT)
Dept: FAMILY MEDICINE CLINIC | Facility: CLINIC | Age: 80
End: 2025-05-21
Payer: MEDICARE

## 2025-05-21 VITALS
SYSTOLIC BLOOD PRESSURE: 118 MMHG | OXYGEN SATURATION: 95 % | DIASTOLIC BLOOD PRESSURE: 86 MMHG | BODY MASS INDEX: 23.15 KG/M2 | HEART RATE: 87 BPM | WEIGHT: 170.9 LBS | HEIGHT: 72 IN

## 2025-05-21 DIAGNOSIS — G51.8 NEURALGIC FACIAL PAIN: Primary | ICD-10-CM

## 2025-05-21 PROCEDURE — 1125F AMNT PAIN NOTED PAIN PRSNT: CPT | Performed by: NURSE PRACTITIONER

## 2025-05-21 PROCEDURE — G2211 COMPLEX E/M VISIT ADD ON: HCPCS | Performed by: NURSE PRACTITIONER

## 2025-05-21 PROCEDURE — 1159F MED LIST DOCD IN RCRD: CPT | Performed by: NURSE PRACTITIONER

## 2025-05-21 PROCEDURE — 99213 OFFICE O/P EST LOW 20 MIN: CPT | Performed by: NURSE PRACTITIONER

## 2025-05-21 PROCEDURE — 1160F RVW MEDS BY RX/DR IN RCRD: CPT | Performed by: NURSE PRACTITIONER

## 2025-05-21 NOTE — PROGRESS NOTES
Office Note     Name: Cristopher Kaba    : 1945     MRN: [id number]     Chief Complaint  Follow-up    Subjective     History of Present Illness:  Cristopher Kaba is a 80 y.o. male who presents today for persistent left-sided facial pain that has somewhat improved with antibiotics prescribed by his dentist.     History of Present Illness  The patient presents for evaluation of persistent facial/head pain.    He reports persistent facial/head pain, similar to his previous visit on 2025. He is scheduled to travel tomorrow and seeks assurance that all necessary measures have been taken to prevent any complications. During the previous visit, he described the pain as sharp and shooting, mostly on the left side of his head, with occasional random pain on the right side. The pain had been ongoing for about two weeks at that time. He had experienced similar pain in the past, which resolved on its own.    Following the previous visit, he contacted his dentist and was prescribed amoxicillin without a dental examination. He started taking the antibiotic on the day of the last visit, and his pain improved but did not completely subside. He did not finish the full course of antibiotics, leaving 2 to 3 tablets unused. He has not contacted his dentist since the initial prescription. The pain has slightly worsened again since discontinuation of the antibiotic.    At his previous exam in this office, laboratory studies were performed to rule out herpes zoster as a contributing source of facial and head pain; results were negative. Imaging studies of the brain and sinuses, as well as referral to neurology, for further evaluation were offered at that visit but were declined by the patient.       Objective     Past Medical History:   Diagnosis Date    High risk medication use     Osteoarthritis     Osteoporosis     Rheumatoid arthritis      History reviewed. No pertinent surgical history.  Family History   Problem  "Relation Age of Onset    Arthritis Mother     Arthritis Father        Vital Signs  /86 (BP Location: Left arm)   Pulse 87   Ht 182.9 cm (72\")   Wt 77.5 kg (170 lb 14.4 oz)   SpO2 95%   BMI 23.18 kg/m²   Estimated body mass index is 23.18 kg/m² as calculated from the following:    Height as of this encounter: 182.9 cm (72\").    Weight as of this encounter: 77.5 kg (170 lb 14.4 oz).    Physical Exam  Vitals reviewed.   Constitutional:       Appearance: Normal appearance.   HENT:      Head: Normocephalic.      Right Ear: Tympanic membrane, ear canal and external ear normal.      Left Ear: Tympanic membrane, ear canal and external ear normal.      Nose: Nose normal.      Right Sinus: No maxillary sinus tenderness or frontal sinus tenderness.      Left Sinus: No maxillary sinus tenderness or frontal sinus tenderness.      Mouth/Throat:      Mouth: Mucous membranes are moist.      Pharynx: Oropharynx is clear.   Cardiovascular:      Rate and Rhythm: Normal rate and regular rhythm.      Heart sounds: Normal heart sounds.   Pulmonary:      Effort: Pulmonary effort is normal.      Breath sounds: Normal breath sounds.   Skin:     General: Skin is warm and dry.   Neurological:      General: No focal deficit present.      Mental Status: He is alert and oriented to person, place, and time.   Psychiatric:         Mood and Affect: Mood normal.         Behavior: Behavior normal.        Physical Exam  Ears: Eardrums appear normal  Mouth/Throat: Throat examination shows no abnormalities  Respiratory: Clear to auscultation, no wheezing, rales or rhonchi  Cardiovascular: Regular rate and rhythm, no murmurs, rubs, or gallops    Results  Labs   - Blood counts: Normal   - Electrolytes: Normal   - Liver function tests: Normal   - Kidney function tests: Normal   - CRP: Normal   - Sed rate: Normal   - IgM: Negative   - IgG: Positive       Assessment and Plan     Diagnoses and all orders for this visit:    1. Neuralgic facial pain " (Primary)      Assessment & Plan  1. Head pain.  - The patient's head pain is potentially attributable to a dental or sinus infection, as evidenced by the partial relief experienced during the course of amoxicillin treatment.  - Physical examination reveals no signs of sinus or ear infection, supporting the hypothesis of dental origin.  - Discussion is had with patient that concern remains for neurologic origin. Patient continues to deny other neurologic complaints and declines further evaluation with neurology referral and/or neurologic scans, citing his impending trip tomorrow and limited timing to complete scans within that time frame. Discussed that ER visit would be the only way neurologic scans could be completed within that short timeframe.   - Advised to seek immediate dental consultation with Dr. Sarai Hussein at [facility] in [place], preferably today, to rule out any potential abscesses of the mouth, as dental exam is significantly limited within this clinic and this APRN's scope.  - If the dental consultation does not yield any significant findings, and his symptoms persist, a referral to neurology or an order for a scan of the brain and sinuses will be considered upon his arrival home from his trip.      Follow Up  Return if symptoms worsen or fail to improve.      Patient or patient representative verbalized consent for the use of Ambient Listening during the visit with  MACI Taylor for chart documentation. 5/26/2025  11:27 EDT    MACI Taylor

## 2025-05-21 NOTE — TELEPHONE ENCOUNTER
Caller: MEMEERMA    Relationship: Spouse    Best call back number: [phone]    What is the medical concern/diagnosis: 2ND OPINION    What specialty or service is being requested: NEUROLOGIST    What is the provider, practice or medical service name:     What is the office location:     What is the office phone number:     Any additional details: ERMA STATES THAT SHE NEEDS REFERRAL AS SOON AS POSSIBLE

## 2025-05-21 NOTE — TELEPHONE ENCOUNTER
Caller: ERMA VALENTINE    Relationship: Spouse    Best call back number: [phone]     What is the medical concern/diagnosis: MEMORY ISSUES    What specialty or service is being requested: NEUROLOGY    What is the provider, practice or medical service name: [facility]     What is the office phone number:  [phone]    FAX: [phone]    Any additional details: ERMA CALLED TO UPDATE THIS REQUEST WITH FURTHER INFORMATION

## 2025-05-22 NOTE — TELEPHONE ENCOUNTER
LM on  to call back.    HUB to relay message     Neurology referral has been entered. Someone from the referral department will be in contact regarding appt within the next 5-10 business days,

## 2025-05-23 ENCOUNTER — TELEPHONE (OUTPATIENT)
Dept: FAMILY MEDICINE CLINIC | Facility: CLINIC | Age: 80
End: 2025-05-23
Payer: MEDICARE

## 2025-05-23 NOTE — TELEPHONE ENCOUNTER
"Caller: Cristopher Kaba \"eHnrry\"    Relationship: Self    Best call back number: [phone]    What was the call regarding:     WE CAN CANCEL NEURO REFERRAL  PATIENT HAS A MASS ON HIS BRAIN AND IS CURRENTLY IN [facility]    "

## 2025-05-23 NOTE — TELEPHONE ENCOUNTER
Patient's wife Veronica (on verbal)  informed and voiced understanding.      Pt's wife took him to the ER Wednesday night and he is currently at [facility]. A mass was found on the moisés and he is going to have a biopsy.

## 2025-05-24 ENCOUNTER — READMISSION MANAGEMENT (OUTPATIENT)
Dept: CALL CENTER | Facility: HOSPITAL | Age: 80
End: 2025-05-24
Payer: MEDICARE

## 2025-05-24 NOTE — OUTREACH NOTE
Prep Survey      Flowsheet Row Responses   [facility] facility patient discharged from? Non-[facility]   Is LACE score < 7 ? Non-BH Discharge   Eligibility [facility]   Date of Admission 05/22/25   Date of Discharge 05/24/25   Discharge Disposition Home or Self Care   Discharge diagnosis Brain mass, BIOPSY, BRAIN, STEREOTACTIC, USING COMPUTER-ASSISTED NAVIGATION   Does the patient have one of the following disease processes/diagnoses(primary or secondary)? Other   Does the patient have Home health ordered? No   Prep survey completed? Yes            Camila MCELROY - Registered Nurse

## 2025-05-27 ENCOUNTER — TRANSITIONAL CARE MANAGEMENT TELEPHONE ENCOUNTER (OUTPATIENT)
Dept: CALL CENTER | Facility: HOSPITAL | Age: 80
End: 2025-05-27
Payer: MEDICARE

## 2025-05-27 NOTE — OUTREACH NOTE
Call Center TCM Note      Flowsheet Row Responses   [facility] facility patient discharged from? Non-  []   Does the patient have one of the following disease processes/diagnoses(primary or secondary)? Other   TCM attempt successful? No   Unsuccessful attempts Attempt 1            RADHA Rocha Registered Nurse    5/27/2025, 09:48 EDT

## 2025-05-27 NOTE — OUTREACH NOTE
Call Center TCM Note      Flowsheet Row Responses   [facility] facility patient discharged from? Non-  []   Does the patient have one of the following disease processes/diagnoses(primary or secondary)? Other   TCM attempt successful? No   Unsuccessful attempts Attempt 2            RADHA Rocha Registered Nurse    5/27/2025, 14:54 EDT

## 2025-05-28 ENCOUNTER — TRANSITIONAL CARE MANAGEMENT TELEPHONE ENCOUNTER (OUTPATIENT)
Dept: CALL CENTER | Facility: HOSPITAL | Age: 80
End: 2025-05-28
Payer: MEDICARE

## 2025-05-28 NOTE — OUTREACH NOTE
Call Center TCM Note      Flowsheet Row Responses   [facility] patient discharged from? Non-   Does the patient have one of the following disease processes/diagnoses(primary or secondary)? Other   TCM attempt successful? Yes   Call start time 1023   Discharge diagnosis Brain mass, BIOPSY, BRAIN, STEREOTACTIC, USING COMPUTER-ASSISTED NAVIGATION   Meds reviewed with patient/caregiver? Yes   Is the patient having any side effects they believe may be caused by any medication additions or changes? No   Does the patient have all medications ordered at discharge? Yes   Is the patient taking all medications as directed (includes completed medication regime)? Yes   Does the patient have an appointment with their PCP within 7-14 days of discharge? No   Has home health visited the patient within 72 hours of discharge? N/A   Did the patient receive a copy of their discharge instructions? Yes   Nursing interventions Reviewed instructions with patient   What is the patient's perception of their health status since discharge? Improving   Is the patient/caregiver able to teach back signs and symptoms related to disease process for when to call PCP? Yes   Is the patient/caregiver able to teach back signs and symptoms related to disease process for when to call 911? Yes   Is the patient/caregiver able to teach back the hierarchy of who to call/visit for symptoms/problems? PCP, Specialist, Home health nurse, Urgent Care, ED, 911 Yes   If the patient is a current smoker, are they able to teach back resources for cessation? Not a smoker   TCM call completed? Yes            Elsie WHITMORE - Registered Nurse    5/28/2025, 11:02 EDT